# Patient Record
Sex: MALE | Race: WHITE | HISPANIC OR LATINO | Employment: OTHER | URBAN - METROPOLITAN AREA
[De-identification: names, ages, dates, MRNs, and addresses within clinical notes are randomized per-mention and may not be internally consistent; named-entity substitution may affect disease eponyms.]

---

## 2017-08-14 ENCOUNTER — OFFICE VISIT (OUTPATIENT)
Dept: INTERNAL MEDICINE | Facility: CLINIC | Age: 72
End: 2017-08-14

## 2017-08-14 VITALS
WEIGHT: 132.88 LBS | DIASTOLIC BLOOD PRESSURE: 64 MMHG | SYSTOLIC BLOOD PRESSURE: 98 MMHG | BODY MASS INDEX: 19.68 KG/M2 | HEART RATE: 68 BPM

## 2017-08-14 DIAGNOSIS — C25.9 MALIGNANT NEOPLASM OF PANCREAS, UNSPECIFIED LOCATION OF MALIGNANCY: Primary | ICD-10-CM

## 2017-08-14 DIAGNOSIS — Z00.00 ROUTINE GENERAL MEDICAL EXAMINATION AT A HEALTH CARE FACILITY: ICD-10-CM

## 2017-08-14 PROCEDURE — 1159F MED LIST DOCD IN RCRD: CPT | Mod: ,,, | Performed by: INTERNAL MEDICINE

## 2017-08-14 PROCEDURE — 3008F BODY MASS INDEX DOCD: CPT | Mod: ,,, | Performed by: INTERNAL MEDICINE

## 2017-08-14 PROCEDURE — 99213 OFFICE O/P EST LOW 20 MIN: CPT | Mod: PBBFAC | Performed by: INTERNAL MEDICINE

## 2017-08-14 PROCEDURE — 99203 OFFICE O/P NEW LOW 30 MIN: CPT | Mod: S$PBB,,, | Performed by: INTERNAL MEDICINE

## 2017-08-14 PROCEDURE — 99999 PR PBB SHADOW E&M-EST. PATIENT-LVL III: CPT | Mod: PBBFAC,,, | Performed by: INTERNAL MEDICINE

## 2017-08-14 NOTE — PROGRESS NOTES
Subjective:       Patient ID: Ortega Morley is a 72 y.o. male.    Chief Complaint: Follow-up and Pancreatic Cancer    HPIVery pleasant gentleman from [facility] here for follow up of recently diagnosed and treated pancreatic cancer. He was in his usual state of good health up until June when he noted jaundice without pain . He had had some pruritus prior to that morning. He was seen by his local MD who admitted him for work up at that time. The studies at that time revealed elevated LFT's, but imaging studies did not reveal any significant abnormalities at that time. His MD recommended he come to the [facility] for evaluation, prompting his visit to the [facility] in [place], [place]. While there, Mr Morley underwent a PM placement 2o bradycradia prior to scheduled ERCP. He subsequently underwent a TURP 20 urinary obstruction 2o BPH - having tolerated the s 2 procedures w/o incident. He later underwent and EGD/EUS that showed a mass at the head of the pancreas and uncinate process. Brushings were obtained that showed adenocarcinoma and was referred for surgery. He underwent a laparoscopic Whipple procedure that was converted to a laparoscopic pancreatectomy, en bloc splenectomy, cholecystectomy and wedge resection of the liver lesion on segment III. The pathology reports indicates extension of the tumor to all the surgical resections with 13 out of 21 long-pancreatic lymph nodes positive for adenocarcinoma. He was instructed on adjuvant treatments, and opted to come to [facility] as he has a home nearby and would be more comfortable during this period. He currently is doing well; is in good spirits as usual, exercising regularly, appetite good, denies pain. He brings outside records and reports which have been scanned into his chart in [facility]. I will refer him initially to Dr Jair Banuelos and proceed from there.Review of Systems   Constitutional: Positive for unexpected weight change. Negative for activity change, appetite  change and fatigue.   Eyes: Negative for visual disturbance.   Respiratory: Negative for cough, shortness of breath and wheezing.    Cardiovascular: Negative for chest pain, palpitations and leg swelling.   Gastrointestinal: Negative for abdominal distention, abdominal pain, blood in stool, nausea and vomiting.   Endocrine: Negative.    Genitourinary: Negative for difficulty urinating.   Musculoskeletal: Negative for arthralgias, back pain and joint swelling.   Neurological: Negative for dizziness, light-headedness, numbness and headaches.   Hematological: Negative for adenopathy.       Objective:      Physical Exam   Constitutional: He is oriented to person, place, and time. No distress.   Thin gentleman in NAD.   HENT:   Head: Normocephalic and atraumatic.   Right Ear: External ear normal.   Left Ear: External ear normal.   Mouth/Throat: Oropharynx is clear and moist. No oropharyngeal exudate.   Eyes: Conjunctivae and EOM are normal. Pupils are equal, round, and reactive to light. Right eye exhibits no discharge. Left eye exhibits no discharge. No scleral icterus.   Neck: Normal range of motion. Neck supple. No JVD present. No thyromegaly present.   Cardiovascular: Normal rate, regular rhythm, normal heart sounds and intact distal pulses.    No murmur heard.  Pulmonary/Chest: Effort normal and breath sounds normal. No respiratory distress. He has no wheezes. He exhibits no tenderness.   Abdominal: Soft. Bowel sounds are normal. He exhibits no distension and no mass. There is no tenderness.   Musculoskeletal: Normal range of motion. He exhibits edema. He exhibits no tenderness.   Trace ankle edema.   Lymphadenopathy:     He has no cervical adenopathy.   Neurological: He is alert and oriented to person, place, and time. No cranial nerve deficit.   Skin: Skin is dry. He is not diaphoretic.   Psychiatric: He has a normal mood and affect. His behavior is normal. Judgment and thought content normal.   Nursing note and  vitals reviewed.      Assessment:       1. Malignant neoplasm of pancreas, unspecified location of malignancy    2. Routine general medical examination at a health care facility     3.     Diabetes - currently on insulin   Plan:    1. Obtain labs including tumor marker Ca 19-9.         2. Refer to DR Banuelos.         3. RTC for review.

## 2017-08-15 ENCOUNTER — INITIAL CONSULT (OUTPATIENT)
Dept: HEMATOLOGY/ONCOLOGY | Facility: CLINIC | Age: 72
End: 2017-08-15

## 2017-08-15 ENCOUNTER — INITIAL CONSULT (OUTPATIENT)
Dept: SURGERY | Facility: CLINIC | Age: 72
End: 2017-08-15

## 2017-08-15 VITALS
WEIGHT: 132 LBS | DIASTOLIC BLOOD PRESSURE: 61 MMHG | BODY MASS INDEX: 20 KG/M2 | SYSTOLIC BLOOD PRESSURE: 94 MMHG | HEIGHT: 68 IN | RESPIRATION RATE: 17 BRPM | HEART RATE: 68 BPM

## 2017-08-15 VITALS
DIASTOLIC BLOOD PRESSURE: 72 MMHG | WEIGHT: 132.06 LBS | BODY MASS INDEX: 20.01 KG/M2 | HEIGHT: 68 IN | HEART RATE: 78 BPM | TEMPERATURE: 98 F | SYSTOLIC BLOOD PRESSURE: 108 MMHG

## 2017-08-15 DIAGNOSIS — C25.8 OVERLAPPING MALIGNANT NEOPLASM OF PANCREAS: Primary | ICD-10-CM

## 2017-08-15 DIAGNOSIS — C25.9 MALIGNANT NEOPLASM OF PANCREAS, UNSPECIFIED LOCATION OF MALIGNANCY: Primary | ICD-10-CM

## 2017-08-15 DIAGNOSIS — C25.8 OVERLAPPING MALIGNANT NEOPLASM OF PANCREAS: ICD-10-CM

## 2017-08-15 PROCEDURE — 3008F BODY MASS INDEX DOCD: CPT | Mod: ,,, | Performed by: INTERNAL MEDICINE

## 2017-08-15 PROCEDURE — 99213 OFFICE O/P EST LOW 20 MIN: CPT | Mod: PBBFAC | Performed by: SURGERY

## 2017-08-15 PROCEDURE — 1126F AMNT PAIN NOTED NONE PRSNT: CPT | Mod: ,,, | Performed by: SURGERY

## 2017-08-15 PROCEDURE — 99999 PR PBB SHADOW E&M-EST. PATIENT-LVL III: CPT | Mod: PBBFAC,,, | Performed by: INTERNAL MEDICINE

## 2017-08-15 PROCEDURE — 99205 OFFICE O/P NEW HI 60 MIN: CPT | Mod: S$PBB,,, | Performed by: INTERNAL MEDICINE

## 2017-08-15 PROCEDURE — 99999 PR PBB SHADOW E&M-EST. PATIENT-LVL III: CPT | Mod: PBBFAC,,, | Performed by: SURGERY

## 2017-08-15 PROCEDURE — 1126F AMNT PAIN NOTED NONE PRSNT: CPT | Mod: ,,, | Performed by: INTERNAL MEDICINE

## 2017-08-15 PROCEDURE — 1159F MED LIST DOCD IN RCRD: CPT | Mod: ,,, | Performed by: SURGERY

## 2017-08-15 PROCEDURE — 1159F MED LIST DOCD IN RCRD: CPT | Mod: ,,, | Performed by: INTERNAL MEDICINE

## 2017-08-15 PROCEDURE — 3008F BODY MASS INDEX DOCD: CPT | Mod: ,,, | Performed by: SURGERY

## 2017-08-15 PROCEDURE — 99213 OFFICE O/P EST LOW 20 MIN: CPT | Mod: PBBFAC,27,PN | Performed by: INTERNAL MEDICINE

## 2017-08-15 PROCEDURE — 99204 OFFICE O/P NEW MOD 45 MIN: CPT | Mod: S$PBB,,, | Performed by: SURGERY

## 2017-08-15 RX ORDER — GLUCOSAMINE/CHONDRO SU A 500-400 MG
1 TABLET ORAL 3 TIMES DAILY
COMMUNITY

## 2017-08-15 RX ORDER — DRONABINOL 2.5 MG/1
CAPSULE ORAL
Refills: 0 | COMMUNITY
Start: 2017-07-29

## 2017-08-15 RX ORDER — INSULIN ASPART 100 [IU]/ML
INJECTION, SUSPENSION SUBCUTANEOUS
COMMUNITY

## 2017-08-15 NOTE — LETTER
August 15, 2017      Noé Brady MD  [address]  [facility] [address]           [facility] S[facility]/S[facility] Onc  [address]  [facility] [address]  Phone: [phone]          Patient: Ortega Morley   MR Number: [id number]   YOB: 1945   Date of Visit: 8/15/2017       Dear Dr. Noé Brady:    Thank you for referring Ortega Morley to me for evaluation. Attached you will find relevant portions of my assessment and plan of care.    If you have questions, please do not hesitate to call me. I look forward to following Ortega Morley along with you.    Sincerely,    Jair Banuelos MD    Enclosure  CC:  No Recipients    If you would like to receive this communication electronically, please contact [email or web address] or [phone] to request more information on [employer] Link access.    For providers and/or their staff who would like to refer a patient to [facility], please contact us through our one-stop-shop provider referral line, [facility] [place], at [phone].    If you feel you have received this communication in error or would no longer like to receive these types of communications, please e-mail [email or web address]

## 2017-08-15 NOTE — PROGRESS NOTES
History & Physical  Surgery      SUBJECTIVE:     Chief Complaint/Reason for Admission: pancreatic cancer    History of Present Illness: Ortega Morley is a 72 y.o. male with head of pancreas adenocarcinoma s/p lap pancreatectomy, en bloc resection of pancreas, cholecystectomy, hepatic segment III wedge resection. Originally presented with obstructive jaundice, was transferred to [facility]. Prior to surgery he also had a PM placement 2/2 bradycardia a TURP for severe BPH.   The pathology reports indicates extension of the tumor to all the surgical resections with 13 out of 21 long-pancreatic lymph nodes positive for adenocarcinoma. He was instructed on adjuvant treatments, and opted to come to [facility] as he has a home nearby and would be more comfortable during this period.     When seen today he was healing well, not complaining of pain, N/V, diarrhea, uncontrolled blood sugars, or other post-op complications. States he is gaining some weight back, but slowly. He wishes to go back to [facility] soon, but we advised him that he needs to see Med Onc before planning anything.     Current Outpatient Prescriptions on File Prior to Visit   Medication Sig    finasteride (PROSCAR) 5 mg tablet Take 1 tablet (5 mg total) by mouth once daily.    [DISCONTINUED] aspirin (ECOTRIN) 81 MG EC tablet Take by mouth. 1 Tablet, Delayed Release (E.C.) Oral Every day    [DISCONTINUED] cetirizine (ZYRTEC) 5 MG tablet Take 5 mg by mouth once daily.    [DISCONTINUED] PRAVASTATIN SODIUM (PRAVASTATIN ORAL) Take by mouth.    [DISCONTINUED] SITagliptan-metformin (JANUMET) 50-1,000 mg per tablet Take 1 tablet by mouth 2 (two) times daily with meals.    [DISCONTINUED] valsartan-hydrochlorothiazide (DIOVAN HCT) 80-12.5 mg per tablet Take by mouth. 1 Tablet Oral Every day     Current Facility-Administered Medications on File Prior to Visit   Medication    ciprofloxacin HCl tablet 500 mg    lidocaine HCl 2% urojet       Review of  patient's allergies indicates:  No Known Allergies    Past Medical History:   Diagnosis Date    BPH (benign prostatic hyperplasia)     163 grams    Cataract     Diabetes mellitus     Diabetes mellitus, type 2     Elevated PSA     Glaucoma     High cholesterol     Hypertension     Stroke      Past Surgical History:   Procedure Laterality Date    APPENDECTOMY      CARDIAC PACEMAKER PLACEMENT      CIRCUMCISION      EYE SURGERY      glaucoma prevention    TRANSURETHRAL RESECTION OF PROSTATE       No family history on file.  Social History   Substance Use Topics    Smoking status: Never Smoker    Smokeless tobacco: Never Used    Alcohol use No        Review of Systems   Constitutional: Positive for fatigue and unexpected weight change. Negative for chills and fever.   Respiratory: Negative for cough, chest tightness and shortness of breath.    Cardiovascular: Negative for chest pain and palpitations.   Gastrointestinal: Negative for abdominal distention, abdominal pain, blood in stool, constipation, diarrhea, nausea and vomiting.     OBJECTIVE:     Vital Signs (Most Recent)  Temp: 97.7 °F (36.5 °C) (08/15/17 1040)  Pulse: 78 (08/15/17 1040)  BP: 108/72 (08/15/17 1040)    Physical Exam   Constitutional: He is oriented to person, place, and time. He appears well-developed and well-nourished. No distress.   Eyes: No scleral icterus.   Cardiovascular: Normal rate and regular rhythm.    Pulmonary/Chest: Effort normal and breath sounds normal.   Abdominal: Soft. He exhibits no distension and no mass. There is no tenderness.   Neurological: He is alert and oriented to person, place, and time.     Laboratory  CEA: 7    Diagnostic Results:       ASSESSMENT/PLAN:     A/P:  73 y/o male s/p pancreatic resection, needs adjuvant therapy with MedOnc. Would prefer [place] based chemo since his house is in [place]     - Referred to Medical Oncology in [place]        Gera Allen MD   [phone]  General  Surgery HO-I  [facility]    I have personally taken the history and examined this patient and agree with the resident's note as stated above.  Outside records including the operative note and pathology report from [facility] personally reviewed.   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 Unfortunately, this appears to have been a somewhat loco-regionally advanced carcinoma with positive long-pancreatic soft tissue margins and multiple positive nodes (R1 resection). He is doing well postop, but is concerned because he has not yet been able to regain weight.   I have recommended consultation with medical oncology in re to postop adjuvant chemotherapy. He asks if it would be ok for him to return home to [facility] and wait a few months to have med onc consultation, but I have encouraged him to have this done in the near future.

## 2017-08-15 NOTE — PROGRESS NOTES
Mr. Morley is 72 years of age.  He is being sent to me by Dr. Jair Banuelos from   the [place].  Pertinent history is that he was diagnosed with locally   advanced pancreatic cancer, had surgical resection at [facility] in   [place] on 07/06/2017.  The patient was referred from [facility].  He had   developed hyperbilirubinemia and biliary stricture, admitted to the local   hospital with itching and jaundice, underwent endoscopic drainage and placement   of plastic stent.  He underwent EGD and EUS in [place] at the [facility] that   showed a 2.5-cm head of pancreas and uncinate mass and a few enlarged lymph   nodes were found.  Brushings were obtained.  Cytology was positive for   adenocarcinoma.  He could not undergo an MRI because of pacemaker and underwent   CT pancreatic protocol.  The patient on 06/29/2017 underwent a laparoscopic   pancreaticoduodenectomy, which was then converted to laparoscopic   pancreatectomy, en bloc splenectomy, cholecystectomy, wedge biopsy of the liver   lesion on segment 3.  Pathology came back invasive moderately differentiated   adenocarcinoma arising in the background of main duct, associated high-grade   dysplasia, 4 cm in greatest extent, adenocarcinoma extending into the adjacent   peripancreatic soft tissue.  All margins were positive for adenocarcinoma.    According to pathology report, 14 out of 38 lymph nodes were positive and the   excised liver nodule was benign and negative for malignancy.  On 07/13/2017, he   went to Medical Oncology consultation.  The patient's appetite was improving   slowly.  He continued to lose some weight.  They were concerned about this and   at the same time, the patient is also requiring TURP which he underwent on   07/24/2017 and is currently healing from the same.    SOCIAL HISTORY:  He lives in [facility], has a house in [place], is actually   [place] living in [facility], therefore has visa issues both in the [place]   as well  as in [facility].    PAST SURGICAL HISTORY:  Significant for appendectomy and previous surgery a   month ago.    PAST MEDICAL HISTORY:  Hypertension, diabetes.  He is modulating his blood   sugars through insulin now and CVA a number of years ago.    SOCIAL HISTORY:  Good family support.  Does not drink or smoke.    FAMILY HISTORY:  Unremarkable.    ALLERGIES:  No known drug allergies.    PHYSICAL EXAMINATION:  GENERAL:  Reveals a well-built, well-nourished male.  Weighs 132 pounds, 5 feet   8 inches, BSA 1.7.  Awake, alert, oriented, does not speak much English, but   does understand our conversation well.  Vitals are stable.  Ambulatory to   clinic.  According to the family and his , Mr. Ortiz and daughter who   are in the room, he has been fluctuating in his weight, attempting to gain some   weight.  Awake, alert, oriented.  HEENT:  Showed no congestion.  NECK:  Supple, without JVD.  Trachea is central.  ABDOMEN:  Well-healed laparoscopic incisions.  No tenderness.  No rebound,   guarding or rigidity.    Labs are from yesterday, which showed hemoglobin of 12.9 and hematocrit 37.6,   white count 9.0.  Comprehensive metabolic panel is within normal range.    Alkaline phosphatase slightly elevated at 139, total protein 5.5, albumin 3.1.    AST and ALT normal.  The patient's A1c is 6.1.  His tumor marker, CA 19-9 is 7   compared to October 2016.  His PSA was 12.8 and most recently 1.7 yesterday.    The patient had CT done, which showed this pancreatic mass, but no other scans   are available for me.  Today while we were having this discussion about adjuvant   therapy, I brought up Dr. Ramirez's recommendations, which I agree with at [facility].  The patient has high-risk features with 14 positive lymph nodes and   positive margins all around.  He has locally advanced pancreatic cancer and   would be chemotherapy candidate and I would also suggest gemcitabine   capecitabine combination.  The patient will  undergo 1000 mg/m2 once a week for 3   weeks every four weeks with capecitabine at 1660 mg/m2 per day, 21 days   followed by 7 days rest.  The plan was to continue cycle for six cycles, i.e. 24   weeks.  There is also consideration of whether radiation should be combined   with 5-FU instillation in sandwich mode with chemotherapy before and after.    While we were discussing this and I was discussing the lymph node status with   him, the patient revealed that he was completely unaware that he had locally   advanced disease, was very upset, had a panic attack and left the clinic room.    His , Mr. Ortiz sat in the clinic room, explaining to me what was   going on when he received a text from the patient asking that they go home so he   could get some rest.  I have urged Mr. Ortiz to consider reconsultation.  If   he is planning on going to [facility], he should go now and be back within a   month or so, so we can start adjuvant regimen in expedited fashion.  He voices   understanding.  I am unable to give a followup visit considering the patient did   leave the clinic abruptly, but I would recommend adjuvant chemotherapy as well.    The patient's friend voices understanding of plan.        /yuan [id number] blank(s)        SSS/HN  dd: 08/15/2017 16:26:56 (CDT)  td: 08/16/2017 09:40:53 (CDT)  Doc ID   #[id number]  Job ID #[id number]    CC:

## 2017-08-15 NOTE — LETTER
August 15, 2017      Jair Banuelos MD  [address]  [facility] [address]           [place] [address] [facility] [facility] [address]  [facility] [address]  Phone: [phone]  Fax: [phone]          Patient: Ortega Morley   MR Number: [id number]   YOB: 1945   Date of Visit: 8/15/2017       Dear Dr. Jair Banuelos:    Thank you for referring Ortega Morley to me for evaluation. Attached you will find relevant portions of my assessment and plan of care.    If you have questions, please do not hesitate to call me. I look forward to following Ortega Morley along with you.    Sincerely,    Mamta Moy MD    Enclosure  CC:  No Recipients    If you would like to receive this communication electronically, please contact [email or web address] or [phone] to request more information on [employer] access.    For providers and/or their staff who would like to refer a patient to [facility], please contact us through our one-stop-shop provider referral line, [facility], at [phone].    If you feel you have received this communication in error or would no longer like to receive these types of communications, please e-mail [email or web address]

## 2017-08-17 ENCOUNTER — OFFICE VISIT (OUTPATIENT)
Dept: INTERNAL MEDICINE | Facility: CLINIC | Age: 72
End: 2017-08-17

## 2017-08-17 DIAGNOSIS — Z71.89 ENCOUNTER FOR MEDICATION REVIEW AND COUNSELING: Primary | ICD-10-CM

## 2017-08-17 DIAGNOSIS — E55.9 VITAMIN D DEFICIENCY: ICD-10-CM

## 2017-08-17 DIAGNOSIS — C25.8 OVERLAPPING MALIGNANT NEOPLASM OF PANCREAS: ICD-10-CM

## 2017-08-17 PROCEDURE — 99999 PR PBB SHADOW E&M-EST. PATIENT-LVL I: CPT | Mod: PBBFAC,,, | Performed by: INTERNAL MEDICINE

## 2017-08-17 PROCEDURE — 99211 OFF/OP EST MAY X REQ PHY/QHP: CPT | Mod: PBBFAC | Performed by: INTERNAL MEDICINE

## 2017-08-17 PROCEDURE — 3008F BODY MASS INDEX DOCD: CPT | Mod: ,,, | Performed by: INTERNAL MEDICINE

## 2017-08-17 PROCEDURE — 1159F MED LIST DOCD IN RCRD: CPT | Mod: ,,, | Performed by: INTERNAL MEDICINE

## 2017-08-17 PROCEDURE — 99213 OFFICE O/P EST LOW 20 MIN: CPT | Mod: S$PBB,,, | Performed by: INTERNAL MEDICINE

## 2017-08-17 RX ORDER — ASPIRIN 325 MG
TABLET, DELAYED RELEASE (ENTERIC COATED) ORAL
Qty: 12 CAPSULE | Refills: 0 | Status: SHIPPED | OUTPATIENT
Start: 2017-08-17

## 2017-09-14 NOTE — PROGRESS NOTES
Mr Morley here today for his review. I gave him copies of his studies and discussed them in detail including blood work that showed low vitamin D at 5. I gave him rx for supplementation 50 K units weekly x 12 weeks. Total protein, albumin were slightly decreased as was H/H showing evidence of anemia. Alk phos was slightly elevated at 139 with normal liver transaminases. C19-9 normal at 7.0. I reiterated the findings and recommendations of Maddison Banuelos and Chinmay regarding his pancreatic cancer and treatment options. He will likely return to [facility] for his treatments as he has home in [place]. I will see him back when he returns.

## 2022-10-06 ENCOUNTER — PATIENT MESSAGE (OUTPATIENT)
Dept: INTERNAL MEDICINE | Facility: CLINIC | Age: 77
End: 2022-10-06

## 2022-10-24 ENCOUNTER — PATIENT MESSAGE (OUTPATIENT)
Dept: INTERNAL MEDICINE | Facility: CLINIC | Age: 77
End: 2022-10-24